# Patient Record
Sex: FEMALE | Race: WHITE | NOT HISPANIC OR LATINO | RURAL
[De-identification: names, ages, dates, MRNs, and addresses within clinical notes are randomized per-mention and may not be internally consistent; named-entity substitution may affect disease eponyms.]

---

## 2023-01-01 ENCOUNTER — LAB OUTSIDE AN ENCOUNTER (OUTPATIENT)
Dept: RURAL CLINIC 2 | Facility: CLINIC | Age: 68
End: 2023-01-01

## 2023-01-01 ENCOUNTER — OFFICE VISIT (OUTPATIENT)
Dept: RURAL CLINIC 2 | Facility: CLINIC | Age: 68
End: 2023-01-01
Payer: MEDICARE

## 2023-01-01 ENCOUNTER — OFFICE VISIT (OUTPATIENT)
Dept: URBAN - METROPOLITAN AREA SURGERY CENTER 14 | Facility: SURGERY CENTER | Age: 68
End: 2023-01-01

## 2023-01-01 ENCOUNTER — OFFICE VISIT (OUTPATIENT)
Dept: RURAL CLINIC 2 | Facility: CLINIC | Age: 68
End: 2023-01-01

## 2023-01-01 ENCOUNTER — DASHBOARD ENCOUNTERS (OUTPATIENT)
Age: 68
End: 2023-01-01

## 2023-01-01 ENCOUNTER — OFFICE VISIT (OUTPATIENT)
Dept: RURAL MEDICAL CENTER 4 | Facility: MEDICAL CENTER | Age: 68
End: 2023-01-01
Payer: MEDICARE

## 2023-01-01 VITALS
BODY MASS INDEX: 38.83 KG/M2 | TEMPERATURE: 97.3 F | WEIGHT: 211 LBS | SYSTOLIC BLOOD PRESSURE: 161 MMHG | DIASTOLIC BLOOD PRESSURE: 85 MMHG | HEIGHT: 62 IN | HEART RATE: 68 BPM

## 2023-01-01 VITALS
DIASTOLIC BLOOD PRESSURE: 96 MMHG | BODY MASS INDEX: 34.96 KG/M2 | HEART RATE: 66 BPM | SYSTOLIC BLOOD PRESSURE: 156 MMHG | TEMPERATURE: 97.1 F | HEIGHT: 62 IN | WEIGHT: 190 LBS

## 2023-01-01 DIAGNOSIS — R10.13 EPIGASTRIC PAIN: ICD-10-CM

## 2023-01-01 DIAGNOSIS — Z87.11 HISTORY OF PEPTIC ULCER DISEASE: ICD-10-CM

## 2023-01-01 DIAGNOSIS — K21.9 ACID REFLUX: ICD-10-CM

## 2023-01-01 DIAGNOSIS — K29.60 EROSIVE GASTRITIS: ICD-10-CM

## 2023-01-01 DIAGNOSIS — R10.10 UPPER ABDOMINAL PAIN: ICD-10-CM

## 2023-01-01 DIAGNOSIS — K29.60 ADENOPAPILLOMATOSIS GASTRICA: ICD-10-CM

## 2023-01-01 DIAGNOSIS — R11.0 NAUSEA: ICD-10-CM

## 2023-01-01 DIAGNOSIS — K21.9 GASTROESOPHAGEAL REFLUX DISEASE, UNSPECIFIED WHETHER ESOPHAGITIS PRESENT: ICD-10-CM

## 2023-01-01 DIAGNOSIS — R19.7 ACUTE DIARRHEA: ICD-10-CM

## 2023-01-01 PROCEDURE — 43239 EGD BIOPSY SINGLE/MULTIPLE: CPT | Performed by: INTERNAL MEDICINE

## 2023-01-01 PROCEDURE — 99213 OFFICE O/P EST LOW 20 MIN: CPT | Performed by: NURSE PRACTITIONER

## 2023-01-01 PROCEDURE — 99203 OFFICE O/P NEW LOW 30 MIN: CPT | Performed by: NURSE PRACTITIONER

## 2023-01-01 RX ORDER — DULOXETINE 30 MG/1
1 CAPSULE CAPSULE, DELAYED RELEASE PELLETS ORAL ONCE A DAY
Status: ACTIVE | COMMUNITY

## 2023-01-01 RX ORDER — METHOTREXATE SODIUM 2.5 MG/1
AS DIRECTED TABLET ORAL
Status: ACTIVE | COMMUNITY

## 2023-01-01 RX ORDER — LISINOPRIL AND HYDROCHLOROTHIAZIDE 20; 25 MG/1; MG/1
1 TABLET TABLET ORAL ONCE A DAY
Status: ACTIVE | COMMUNITY

## 2023-01-01 RX ORDER — ATENOLOL 25 MG/1
1 TABLET TABLET ORAL ONCE A DAY
Status: ACTIVE | COMMUNITY

## 2023-01-01 RX ORDER — MONTELUKAST 10 MG/1
1 TABLET TABLET, FILM COATED ORAL ONCE A DAY
Status: ACTIVE | COMMUNITY

## 2023-01-01 RX ORDER — HYDROCODONE BITARTRATE AND ACETAMINOPHEN 7.5; 325 MG/1; MG/1
1 TABLET AS NEEDED TABLET ORAL
Status: ACTIVE | COMMUNITY

## 2023-01-01 RX ORDER — DICLOFENAC SODIUM 50 MG/1
1 TABLET AS NEEDED TABLET, DELAYED RELEASE ORAL TWICE A DAY
Status: ACTIVE | COMMUNITY

## 2023-01-01 RX ORDER — TRAZODONE HYDROCHLORIDE 50 MG/1
1 TABLET AT BEDTIME AS NEEDED TABLET ORAL ONCE A DAY
Status: DISCONTINUED | COMMUNITY

## 2023-01-01 RX ORDER — ROPINIROLE HYDROCHLORIDE 0.25 MG/1
1 TABLET 1 TO 3 HOURS BEFORE BEDTIME TABLET, FILM COATED ORAL ONCE A DAY
Status: ACTIVE | COMMUNITY

## 2023-01-01 RX ORDER — SEMAGLUTIDE 2.68 MG/ML
AS DIRECTED INJECTION, SOLUTION SUBCUTANEOUS
Status: ACTIVE | COMMUNITY

## 2023-01-01 RX ORDER — SIMVASTATIN 40 MG
1 TABLET IN THE EVENING TABLET ORAL ONCE A DAY
Status: ACTIVE | COMMUNITY

## 2023-01-01 RX ORDER — METFORMIN HYDROCHLORIDE 500 MG/1
1 TABLET WITH A MEAL TABLET, FILM COATED ORAL ONCE A DAY
Status: ACTIVE | COMMUNITY

## 2023-01-01 RX ORDER — OMEPRAZOLE 20 MG/1
1 CAPSULE 30 MINUTES BEFORE MORNING MEAL CAPSULE, DELAYED RELEASE ORAL ONCE A DAY
Status: ACTIVE | COMMUNITY

## 2023-01-01 RX ORDER — SUCRALFATE 1 G/1
1 TABLET ON AN EMPTY STOMACH TABLET ORAL TWICE A DAY
Status: ACTIVE | COMMUNITY

## 2023-01-01 RX ORDER — OMEPRAZOLE 40 MG/1
1 CAPSULE CAPSULE, DELAYED RELEASE ORAL
Qty: 180 | Refills: 3

## 2023-01-01 RX ORDER — BACLOFEN 10 MG/1
1 TABLET AS NEEDED TABLET ORAL TWICE A DAY
Status: ACTIVE | COMMUNITY

## 2023-01-01 RX ORDER — FERROUS SULFATE 325(65) MG
1 TABLET TABLET ORAL
Status: ACTIVE | COMMUNITY

## 2023-01-01 RX ORDER — SUCRALFATE 1 G/1
1 TABLET ON AN EMPTY STOMACH TABLET ORAL
Qty: 120 | Refills: 0 | OUTPATIENT
Start: 2023-01-01 | End: 2023-10-20

## 2023-01-01 RX ORDER — SODIUM PICOSULFATE, MAGNESIUM OXIDE, AND ANHYDROUS CITRIC ACID 12; 3.5; 1 G/175ML; G/175ML; MG/175ML
175 ML THE FIRST DOSE THE EVENING BEFORE AND SECOND DOSE THE MORNING OF COLONOSCOPY LIQUID ORAL ONCE A DAY
Qty: 350 | Refills: 0 | OUTPATIENT
Start: 2023-01-01 | End: 2023-01-01

## 2023-01-01 RX ORDER — ALLOPURINOL 100 MG/1
1 TABLET TABLET ORAL ONCE A DAY
Status: ACTIVE | COMMUNITY

## 2023-01-01 RX ORDER — TRAZODONE HYDROCHLORIDE 50 MG/1
1 TABLET AT BEDTIME AS NEEDED TABLET ORAL ONCE A DAY
Status: ACTIVE | COMMUNITY

## 2023-01-01 RX ORDER — OMEPRAZOLE 40 MG/1
1 CAPSULE CAPSULE, DELAYED RELEASE ORAL
Qty: 180 | Refills: 3 | Status: ACTIVE | COMMUNITY

## 2023-01-01 RX ORDER — ROPINIROLE HYDROCHLORIDE 0.25 MG/1
1 TABLET 1 TO 3 HOURS BEFORE BEDTIME TABLET, FILM COATED ORAL ONCE A DAY
Status: DISCONTINUED | COMMUNITY

## 2023-07-11 PROBLEM — 79922009: Status: ACTIVE | Noted: 2023-01-01

## 2023-07-11 PROBLEM — 266998003: Status: ACTIVE | Noted: 2023-01-01

## 2023-07-11 PROBLEM — 235595009: Status: ACTIVE | Noted: 2023-01-01

## 2023-07-11 PROBLEM — 422587007: Status: ACTIVE | Noted: 2023-01-01

## 2023-07-11 NOTE — HPI-ZZZTODAY'S VISIT
The patient is a 68-year-old female who presents on referral from Dr. Katerina Kay for chronic GERD with symptoms of epigastric abdominal pain, nausea and history of peptic ulcer disease.  A copy of this document to be sent to the referring provider.  The patient reports a few month history of worsening GERD with epigastric abdominal pain, bloating and intermittent nausea.  She was tested for H. pylori with a negative result.  She was recently started on Carafate and seems to be helping.  She underwent an upper endoscopy over 10 years ago with findings of peptic ulcer disease.  Her last colonoscopy was completed 6 to 7 years ago and denies a history of colon polyps.  Past medical history of hypertension, hyperlipidemia and diabetes type 2.

## 2023-09-20 PROBLEM — 266435005: Status: ACTIVE | Noted: 2023-01-01

## 2023-09-20 PROBLEM — 62315008: Status: ACTIVE | Noted: 2023-01-01

## 2023-09-20 PROBLEM — 83132003: Status: ACTIVE | Noted: 2023-01-01

## 2023-09-20 PROBLEM — 1086791000119100: Status: ACTIVE | Noted: 2023-01-01

## 2023-09-20 NOTE — HPI-ZZZTODAY'S VISIT
The patient is a 68-year-old female who presents on referral from Dr. Katerina Kay for chronic GERD with symptoms of epigastric abdominal pain, nausea and history of peptic ulcer disease.  A copy of this document to be sent to the referring provider.  The patient reports a few month history of worsening GERD with epigastric abdominal pain, bloating and intermittent nausea.  She was tested for H. pylori with a negative result.  She was recently started on Carafate and seems to be helping.  She underwent an upper endoscopy over 10 years ago with findings of peptic ulcer disease.  Her last colonoscopy was completed 6 to 7 years ago and denies a history of colon polyps.  Past medical history of hypertension, hyperlipidemia and diabetes type 2. 09/20/2023 The patient is following up from an upper endoscopy completed for heartburn and epigastric abdominal pain revealing a normal esophagus, severe gastritis in the body, antrum and prepyloric area of the stomach characterized by nodular granular erythema and multiple superficial ulcerations.  Extensive biopsies taken revealing mild chronic inflammation and reactive changes otherwise normal.  She reports continued epigastric abdominal pain that seems to have worsened since her procedure 4 weeks ago.  She continues with intermittent nausea, loss of appetite and fatigue. She denies vomiting and reports frequent, urgent diarrhea ongoing for the past few weeks occuring up to 5 times per day.  She continues on omeprazole 40 mg twice daily and sucralfate twice daily.  She followed up with Dr. Alcocer for her abdominal pain and new onset of diarrhea and stool culture and C. difficile testing completed and unremarkable.  Gastrin level ordered to rule out Zollinger Stephen syndrome and pending.  The patient reportedly was seen at the local emergency room a few times over the past month for her pain with unremarkable findings per her own words.  ER records available and reviewed and CT  scan of the abdomen and pelvis with contrast and an abdominal ultrasound  both completed on September 14   and no acute findings were noted.  Hepatic enzymes and lipase level within normal range.

## 2023-10-26 ENCOUNTER — OFFICE VISIT (OUTPATIENT)
Dept: RURAL MEDICAL CENTER 4 | Facility: MEDICAL CENTER | Age: 68
End: 2023-10-26